# Patient Record
Sex: FEMALE | Race: WHITE | Employment: FULL TIME | URBAN - METROPOLITAN AREA
[De-identification: names, ages, dates, MRNs, and addresses within clinical notes are randomized per-mention and may not be internally consistent; named-entity substitution may affect disease eponyms.]

---

## 2024-07-30 ENCOUNTER — HOSPITAL ENCOUNTER (EMERGENCY)
Facility: HOSPITAL | Age: 24
Discharge: HOME | End: 2024-07-30
Attending: STUDENT IN AN ORGANIZED HEALTH CARE EDUCATION/TRAINING PROGRAM
Payer: MEDICAID

## 2024-07-30 ENCOUNTER — APPOINTMENT (OUTPATIENT)
Dept: RADIOLOGY | Facility: HOSPITAL | Age: 24
End: 2024-07-30
Payer: MEDICAID

## 2024-07-30 VITALS
HEART RATE: 60 BPM | OXYGEN SATURATION: 98 % | RESPIRATION RATE: 18 BRPM | DIASTOLIC BLOOD PRESSURE: 92 MMHG | HEIGHT: 62 IN | WEIGHT: 230 LBS | SYSTOLIC BLOOD PRESSURE: 138 MMHG | BODY MASS INDEX: 42.33 KG/M2 | TEMPERATURE: 98.2 F

## 2024-07-30 DIAGNOSIS — N12 PYELONEPHRITIS: Primary | ICD-10-CM

## 2024-07-30 DIAGNOSIS — M54.6 ACUTE MIDLINE THORACIC BACK PAIN: ICD-10-CM

## 2024-07-30 LAB
ALBUMIN SERPL-MCNC: 3.9 G/DL (ref 3.5–5)
ALP BLD-CCNC: 125 U/L (ref 35–125)
ALT SERPL-CCNC: 20 U/L (ref 5–40)
ANION GAP SERPL CALC-SCNC: 13 MMOL/L
APPEARANCE UR: ABNORMAL
AST SERPL-CCNC: 14 U/L (ref 5–40)
BACTERIA #/AREA URNS AUTO: ABNORMAL /HPF
BASOPHILS # BLD AUTO: 0.03 X10*3/UL (ref 0–0.1)
BASOPHILS NFR BLD AUTO: 0.3 %
BILIRUB SERPL-MCNC: 0.2 MG/DL (ref 0.1–1.2)
BILIRUB UR STRIP.AUTO-MCNC: NEGATIVE MG/DL
BUN SERPL-MCNC: 11 MG/DL (ref 8–25)
CALCIUM SERPL-MCNC: 9.4 MG/DL (ref 8.5–10.4)
CHLORIDE SERPL-SCNC: 104 MMOL/L (ref 97–107)
CO2 SERPL-SCNC: 22 MMOL/L (ref 24–31)
COLOR UR: ABNORMAL
CREAT SERPL-MCNC: 0.7 MG/DL (ref 0.4–1.6)
EGFRCR SERPLBLD CKD-EPI 2021: >90 ML/MIN/1.73M*2
EOSINOPHIL # BLD AUTO: 0.11 X10*3/UL (ref 0–0.7)
EOSINOPHIL NFR BLD AUTO: 0.9 %
ERYTHROCYTE [DISTWIDTH] IN BLOOD BY AUTOMATED COUNT: 12.8 % (ref 11.5–14.5)
GLUCOSE SERPL-MCNC: 99 MG/DL (ref 65–99)
GLUCOSE UR STRIP.AUTO-MCNC: NORMAL MG/DL
HCT VFR BLD AUTO: 42.5 % (ref 36–46)
HGB BLD-MCNC: 13.7 G/DL (ref 12–16)
HOLD SPECIMEN: NORMAL
HYALINE CASTS #/AREA URNS AUTO: ABNORMAL /LPF
IMM GRANULOCYTES # BLD AUTO: 0.04 X10*3/UL (ref 0–0.7)
IMM GRANULOCYTES NFR BLD AUTO: 0.3 % (ref 0–0.9)
KETONES UR STRIP.AUTO-MCNC: NEGATIVE MG/DL
LEUKOCYTE ESTERASE UR QL STRIP.AUTO: ABNORMAL
LYMPHOCYTES # BLD AUTO: 2.64 X10*3/UL (ref 1.2–4.8)
LYMPHOCYTES NFR BLD AUTO: 22.7 %
MAGNESIUM SERPL-MCNC: 2 MG/DL (ref 1.6–3.1)
MCH RBC QN AUTO: 28.1 PG (ref 26–34)
MCHC RBC AUTO-ENTMCNC: 32.2 G/DL (ref 32–36)
MCV RBC AUTO: 87 FL (ref 80–100)
MONOCYTES # BLD AUTO: 0.46 X10*3/UL (ref 0.1–1)
MONOCYTES NFR BLD AUTO: 4 %
MUCOUS THREADS #/AREA URNS AUTO: ABNORMAL /LPF
NEUTROPHILS # BLD AUTO: 8.35 X10*3/UL (ref 1.2–7.7)
NEUTROPHILS NFR BLD AUTO: 71.8 %
NITRITE UR QL STRIP.AUTO: NEGATIVE
NRBC BLD-RTO: 0 /100 WBCS (ref 0–0)
PH UR STRIP.AUTO: 6.5 [PH]
PLATELET # BLD AUTO: 366 X10*3/UL (ref 150–450)
POTASSIUM SERPL-SCNC: 3.7 MMOL/L (ref 3.4–5.1)
PROT SERPL-MCNC: 6.8 G/DL (ref 5.9–7.9)
PROT UR STRIP.AUTO-MCNC: ABNORMAL MG/DL
RBC # BLD AUTO: 4.87 X10*6/UL (ref 4–5.2)
RBC # UR STRIP.AUTO: ABNORMAL /UL
RBC #/AREA URNS AUTO: ABNORMAL /HPF
SODIUM SERPL-SCNC: 139 MMOL/L (ref 133–145)
SP GR UR STRIP.AUTO: 1.02
SQUAMOUS #/AREA URNS AUTO: ABNORMAL /HPF
UROBILINOGEN UR STRIP.AUTO-MCNC: NORMAL MG/DL
WBC # BLD AUTO: 11.6 X10*3/UL (ref 4.4–11.3)
WBC #/AREA URNS AUTO: ABNORMAL /HPF

## 2024-07-30 PROCEDURE — 72128 CT CHEST SPINE W/O DYE: CPT

## 2024-07-30 PROCEDURE — 71046 X-RAY EXAM CHEST 2 VIEWS: CPT | Performed by: RADIOLOGY

## 2024-07-30 PROCEDURE — 36415 COLL VENOUS BLD VENIPUNCTURE: CPT | Performed by: STUDENT IN AN ORGANIZED HEALTH CARE EDUCATION/TRAINING PROGRAM

## 2024-07-30 PROCEDURE — 2500000005 HC RX 250 GENERAL PHARMACY W/O HCPCS: Performed by: STUDENT IN AN ORGANIZED HEALTH CARE EDUCATION/TRAINING PROGRAM

## 2024-07-30 PROCEDURE — 2500000001 HC RX 250 WO HCPCS SELF ADMINISTERED DRUGS (ALT 637 FOR MEDICARE OP): Performed by: STUDENT IN AN ORGANIZED HEALTH CARE EDUCATION/TRAINING PROGRAM

## 2024-07-30 PROCEDURE — 72128 CT CHEST SPINE W/O DYE: CPT | Performed by: RADIOLOGY

## 2024-07-30 PROCEDURE — 87086 URINE CULTURE/COLONY COUNT: CPT | Mod: WESLAB | Performed by: STUDENT IN AN ORGANIZED HEALTH CARE EDUCATION/TRAINING PROGRAM

## 2024-07-30 PROCEDURE — 71046 X-RAY EXAM CHEST 2 VIEWS: CPT

## 2024-07-30 PROCEDURE — 81001 URINALYSIS AUTO W/SCOPE: CPT | Performed by: STUDENT IN AN ORGANIZED HEALTH CARE EDUCATION/TRAINING PROGRAM

## 2024-07-30 PROCEDURE — 80053 COMPREHEN METABOLIC PANEL: CPT | Performed by: STUDENT IN AN ORGANIZED HEALTH CARE EDUCATION/TRAINING PROGRAM

## 2024-07-30 PROCEDURE — 83735 ASSAY OF MAGNESIUM: CPT | Performed by: STUDENT IN AN ORGANIZED HEALTH CARE EDUCATION/TRAINING PROGRAM

## 2024-07-30 PROCEDURE — 99284 EMERGENCY DEPT VISIT MOD MDM: CPT | Mod: 25

## 2024-07-30 PROCEDURE — 85025 COMPLETE CBC W/AUTO DIFF WBC: CPT | Performed by: STUDENT IN AN ORGANIZED HEALTH CARE EDUCATION/TRAINING PROGRAM

## 2024-07-30 RX ORDER — ACETAMINOPHEN 500 MG
1000 TABLET ORAL ONCE
Status: COMPLETED | OUTPATIENT
Start: 2024-07-30 | End: 2024-07-30

## 2024-07-30 RX ORDER — METHOCARBAMOL 500 MG/1
500 TABLET, FILM COATED ORAL EVERY 6 HOURS SCHEDULED
Status: DISCONTINUED | OUTPATIENT
Start: 2024-07-30 | End: 2024-07-30 | Stop reason: HOSPADM

## 2024-07-30 RX ORDER — ONDANSETRON 4 MG/1
4 TABLET, ORALLY DISINTEGRATING ORAL ONCE
Status: COMPLETED | OUTPATIENT
Start: 2024-07-30 | End: 2024-07-30

## 2024-07-30 RX ORDER — LIDOCAINE 560 MG/1
1 PATCH PERCUTANEOUS; TOPICAL; TRANSDERMAL DAILY
Status: DISCONTINUED | OUTPATIENT
Start: 2024-07-30 | End: 2024-07-30 | Stop reason: HOSPADM

## 2024-07-30 RX ORDER — CEPHALEXIN 500 MG/1
500 CAPSULE ORAL 4 TIMES DAILY
Qty: 28 CAPSULE | Refills: 0 | Status: SHIPPED | OUTPATIENT
Start: 2024-07-30 | End: 2024-08-06

## 2024-07-30 ASSESSMENT — PAIN DESCRIPTION - ONSET: ONSET: ONGOING

## 2024-07-30 ASSESSMENT — PAIN - FUNCTIONAL ASSESSMENT: PAIN_FUNCTIONAL_ASSESSMENT: 0-10

## 2024-07-30 ASSESSMENT — PAIN DESCRIPTION - PROGRESSION: CLINICAL_PROGRESSION: NOT CHANGED

## 2024-07-30 ASSESSMENT — COLUMBIA-SUICIDE SEVERITY RATING SCALE - C-SSRS
1. IN THE PAST MONTH, HAVE YOU WISHED YOU WERE DEAD OR WISHED YOU COULD GO TO SLEEP AND NOT WAKE UP?: NO
6. HAVE YOU EVER DONE ANYTHING, STARTED TO DO ANYTHING, OR PREPARED TO DO ANYTHING TO END YOUR LIFE?: NO
2. HAVE YOU ACTUALLY HAD ANY THOUGHTS OF KILLING YOURSELF?: NO

## 2024-07-30 ASSESSMENT — PAIN SCALES - GENERAL: PAINLEVEL_OUTOF10: 9

## 2024-07-30 ASSESSMENT — PAIN DESCRIPTION - FREQUENCY: FREQUENCY: CONSTANT/CONTINUOUS

## 2024-07-30 ASSESSMENT — PAIN DESCRIPTION - PAIN TYPE: TYPE: ACUTE PAIN

## 2024-07-30 NOTE — DISCHARGE INSTRUCTIONS
You were seen today for a kidney infection.  I will put you on antibiotic, you will take it 4 times a day for the next 7 days, please follow-up with your primary care provider, like we discussed, your CT scan comes back with something concerning I will contact you.  Please follow-up with your primary care provider, please return the ER for any worsening symptoms.

## 2024-07-30 NOTE — ED PROVIDER NOTES
HPI   Chief Complaint   Patient presents with    Back Pain     Pt reports Back pain that started 4 days ago on and off and became constant around 0300 this morning. Pt reports pain wraps around the flanks to the front of the body. Pt is not noting some numbness in hands and legs. PT is 4 weeks post-partum. Pt also endorses nausea and vomiting.        Patient is a 23-year-old female presenting for a chief complaint of mid upper thoracic back pain.  Patient states over the last 4 to 5 days been getting progressively worse, more intense.  Patient states this feels like sharp pressure in the back of her back.  Patient states that this morning, she felt like she was going some minimal numbness in her hands, however this resolved spontaneously.  Patient denies any recent falls, denies any IV drug use, patient states that she does have a history of fusion in her lower spine that was present at birth.  Patient states that she did have an epidural when her child was delivered, patient denies any fevers or chills, denies any dysuria or hematuria, patient is he headache or dizziness, denies any blurred vision.  Patient has no other acute complaints at this time.      History provided by:  Patient          Patient History   History reviewed. No pertinent past medical history.  History reviewed. No pertinent surgical history.  No family history on file.  Social History     Tobacco Use    Smoking status: Never    Smokeless tobacco: Never   Substance Use Topics    Alcohol use: Not on file    Drug use: Not on file       Physical Exam   ED Triage Vitals [07/30/24 0531]   Temperature Heart Rate Respirations BP   36.8 °C (98.2 °F) 60 18 (!) 138/92      Pulse Ox Temp Source Heart Rate Source Patient Position   98 % Temporal Monitor Sitting      BP Location FiO2 (%)     Left arm --       Physical Exam  Vitals and nursing note reviewed. Exam conducted with a chaperone present.   Constitutional:       General: She is not in acute  distress.     Appearance: Normal appearance. She is normal weight. She is not ill-appearing.   HENT:      Head: Normocephalic and atraumatic.      Nose: Nose normal.      Mouth/Throat:      Mouth: Mucous membranes are moist.      Pharynx: Oropharynx is clear.   Eyes:      Extraocular Movements: Extraocular movements intact.      Conjunctiva/sclera: Conjunctivae normal.      Pupils: Pupils are equal, round, and reactive to light.   Cardiovascular:      Rate and Rhythm: Normal rate and regular rhythm.      Pulses: Normal pulses.      Heart sounds: Normal heart sounds.   Pulmonary:      Effort: Pulmonary effort is normal.      Breath sounds: Normal breath sounds.   Abdominal:      General: Abdomen is flat. Bowel sounds are normal.      Palpations: Abdomen is soft.   Musculoskeletal:      Cervical back: Normal range of motion and neck supple.   Skin:     General: Skin is warm and dry.      Capillary Refill: Capillary refill takes less than 2 seconds.   Neurological:      General: No focal deficit present.      Mental Status: She is alert and oriented to person, place, and time. Mental status is at baseline.   Psychiatric:         Mood and Affect: Mood normal.         Behavior: Behavior normal.         Thought Content: Thought content normal.           ED Course & MDM   Diagnoses as of 07/30/24 0903   Acute midline thoracic back pain   Pyelonephritis                       No data recorded                      Medical Decision Making  Patient seen and evaluated at bedside, patient is in no acute distress.  I will order a CBC, CMP, urinalysis, x-ray chest, CT thoracic spine, Robaxin, Tylenol, lidocaine patch. Differential diagnosis includes but is not limited to muscle spasm, rib dysfunction, musculoskeletal back pain..  Patient CBC shows minimal leukocytosis of 11.6, patient's urinalysis shows 11-20 whites, 1+ bacteria, leukocyte Estrace.  Patient's x-ray chest and CT thoracic spine do not reveal any acute abnormalities.   I will treat this as a pyelonephritis due to the patient having flank pain associated with this.  Patient will be discharged home with a prescription for cephalexin 500 mg 4 times daily x 7 days.  Strict return precautions were discussed the patient, she is agreeable to discharge plan.    Diagnosis: Pyelonephritis, back pain  CT thoracic spine wo IV contrast   Final Result    No acute abnormalities or findings to account for the patient's pain.          MACRO:    None          Signed by: Jose Davis 7/30/2024 8:46 AM    Dictation workstation:   [id number]     XR chest 2 views   Final Result    1.  No evidence of acute cardiopulmonary process.                      MACRO:    None          Signed by: Jose Davis 7/30/2024 8:30 AM    Dictation workstation:   [id number]     Results for orders placed or performed during the hospital encounter of 07/30/24  -CBC and Auto Differential:        Result                      Value             Ref Range           WBC                         11.6 (H)          4.4 - 11.3 x*       nRBC                        0.0               0.0 - 0.0 /1*       RBC                         4.87              4.00 - 5.20 *       Hemoglobin                  13.7              12.0 - 16.0 *       Hematocrit                  42.5              36.0 - 46.0 %       MCV                         87                80 - 100 fL         MCH                         28.1              26.0 - 34.0 *       MCHC                        32.2              32.0 - 36.0 *       RDW                         12.8              11.5 - 14.5 %       Platelets                   366               150 - 450 x1*       Neutrophils %               71.8              40.0 - 80.0 %       Immature Granulocytes *     0.3               0.0 - 0.9 %         Lymphocytes %               22.7              13.0 - 44.0 %       Monocytes %                 4.0               2.0 - 10.0 %        Eosinophils %               0.9               0.0  - 6.0 %         Basophils %                 0.3               0.0 - 2.0 %         Neutrophils Absolute        8.35 (H)          1.20 - 7.70 *       Immature Granulocytes *     0.04              0.00 - 0.70 *       Lymphocytes Absolute        2.64              1.20 - 4.80 *       Monocytes Absolute          0.46              0.10 - 1.00 *       Eosinophils Absolute        0.11              0.00 - 0.70 *       Basophils Absolute          0.03              0.00 - 0.10 *  -Magnesium:        Result                      Value             Ref Range           Magnesium                   2.00              1.60 - 3.10 *  -Comprehensive metabolic panel:        Result                      Value             Ref Range           Glucose                     99                65 - 99 mg/dL       Sodium                      139               133 - 145 mm*       Potassium                   3.7               3.4 - 5.1 mm*       Chloride                    104               97 - 107 mmo*       Bicarbonate                 22 (L)            24 - 31 mmol*       Urea Nitrogen               11                8 - 25 mg/dL        Creatinine                  0.70              0.40 - 1.60 *       eGFR                        >90               >60 mL/min/1*       Calcium                     9.4               8.5 - 10.4 m*       Albumin                     3.9               3.5 - 5.0 g/*       Alkaline Phosphatase        125               35 - 125 U/L        Total Protein               6.8               5.9 - 7.9 g/*       AST                         14                5 - 40 U/L          Bilirubin, Total            0.2               0.1 - 1.2 mg*       ALT                         20                5 - 40 U/L          Anion Gap                   13                <=19 mmol/L    -Urinalysis with Reflex Culture and Microscopic:        Result                      Value             Ref Range           Color, Urine                Light-Yellow       Light-Yellow*       Appearance, Urine           Turbid (N)        Clear               Specific Gravity, Urine     1.022             1.005 - 1.035       pH, Urine                   6.5               5.0, 5.5, 6.*       Protein, Urine              10 (TRACE)        NEGATIVE, 10*       Glucose, Urine              Normal            Normal mg/dL        Blood, Urine                0.06 (1+) (A)     NEGATIVE            Ketones, Urine              NEGATIVE          NEGATIVE mg/*       Bilirubin, Urine            NEGATIVE          NEGATIVE            Urobilinogen, Urine         Normal            Normal mg/dL        Nitrite, Urine              NEGATIVE          NEGATIVE            Leukocyte Esterase, Ur*                       NEGATIVE        250 Justen/µL (A)  -Microscopic Only, Urine:        Result                      Value             Ref Range           WBC, Urine                  11-20 (A)         1-5, NONE /H*       RBC, Urine                  6-10 (A)          NONE, 1-2, 3*       Squamous Epithelial Ce*     10-25 (FEW)       Reference ra*       Bacteria, Urine             1+ (A)            NONE SEEN /H*       Mucus, Urine                2+                Reference ra*       Hyaline Casts, Urine                          NONE /LPF       OCCASIONAL (A)          Procedure  Procedures    Sections of this report were created using voice-to-text technology and may contain errors in translation    Marcelino Burrell DO  Emergency Medicine         Marcelino Burrell DO  07/30/24 0931

## 2024-07-30 NOTE — ED TRIAGE NOTES
TRIAGE NOTE   I saw the patient as the Clinician in Triage and performed a brief history and physical exam, established acuity, and ordered appropriate tests to develop basic plan of care. Patient will be seen by an PATRICIA, resident and/or physician who will independently evaluate the patient. Please see subsequent provider notes for further details and disposition.     Brief HPI: In brief, Britney Simmons is a 23 y.o. female that presents for mid upper thoracic pain for the last few days gradually getting worse and becoming more intense.  Describes as being pressure-like.  Not experiencing some numbness and bilateral hands.  Denies any recent falls, IVDU, spinal surgery but notes she did receive an epidural during delivery of her child, steroid use.  Denies any facial fever/chills, urinary symptoms.  States she is thinking/talking/walking appropriately, no headache or vision changes.  Does note some associated nausea but no bouts of emesis.  No changes in bowel habits.     Focused Physical exam:   Physical Exam  Vitals and nursing note reviewed.   Constitutional:       Appearance: Normal appearance. She is normal weight.   Eyes:      Pupils: Pupils are equal, round, and reactive to light.   Cardiovascular:      Rate and Rhythm: Normal rate.   Pulmonary:      Effort: Pulmonary effort is normal.   Musculoskeletal:      Cervical back: Tenderness and bony tenderness present. No signs of trauma.      Thoracic back: Tenderness and bony tenderness present. No signs of trauma.      Lumbar back: Tenderness and bony tenderness present. No signs of trauma.      Comments: Midline C/T/L-spine tenderness, no appreciable spinous process step-off/deformities, no evidence of trauma, no appreciable paraspinal tenderness bilaterally   Neurological:      General: No focal deficit present.      Mental Status: She is alert and oriented to person, place, and time.      Sensory: Sensation is intact. No sensory deficit.      Motor: Motor  function is intact. No weakness or abnormal muscle tone.      Gait: Gait is intact. Gait normal.      Comments: Gross motor/strength/sensation intact x 4, able to ambulate unassisted leg appreciable instability/difficulty            Plan/MDM:   This is a brief assessment of patient endorsing back pain with recent  5 weeks ago and new neuro symptoms of distal BUE.  Elevated DPP concerning for potential early onset postpartum preeclampsia.  Placed initial labs and provided multimodal analgesia for symptomatic management.  Signed patient out to oncoming provider Dr. Marcelino Burrell in stable condition.  Plan of care and dispo pending initial workup and full assessment.    Please see subsequent provider note for further details and disposition

## 2024-07-30 NOTE — Clinical Note
Britney Simmons was seen and treated in our emergency department on 7/30/2024.  She may return to work on 07/31/2024.       If you have any questions or concerns, please don't hesitate to call.      Marcelino Burrell, DO

## 2024-07-31 LAB — BACTERIA UR CULT: NORMAL
